# Patient Record
Sex: MALE | Race: BLACK OR AFRICAN AMERICAN | NOT HISPANIC OR LATINO | RURAL
[De-identification: names, ages, dates, MRNs, and addresses within clinical notes are randomized per-mention and may not be internally consistent; named-entity substitution may affect disease eponyms.]

---

## 2022-01-24 ENCOUNTER — OFFICE VISIT (OUTPATIENT)
Dept: FAMILY MEDICINE | Facility: CLINIC | Age: 47
End: 2022-01-24
Payer: COMMERCIAL

## 2022-01-24 VITALS
DIASTOLIC BLOOD PRESSURE: 74 MMHG | OXYGEN SATURATION: 99 % | BODY MASS INDEX: 27.4 KG/M2 | HEIGHT: 69 IN | TEMPERATURE: 100 F | RESPIRATION RATE: 18 BRPM | SYSTOLIC BLOOD PRESSURE: 141 MMHG | WEIGHT: 185 LBS | HEART RATE: 98 BPM

## 2022-01-24 DIAGNOSIS — R51.9 HEAD ACHE: ICD-10-CM

## 2022-01-24 DIAGNOSIS — R50.9 FEVER, UNSPECIFIED FEVER CAUSE: ICD-10-CM

## 2022-01-24 DIAGNOSIS — Z11.52 ENCOUNTER FOR SCREENING LABORATORY TESTING FOR COVID-19 VIRUS: Primary | ICD-10-CM

## 2022-01-24 DIAGNOSIS — J02.9 PHARYNGITIS, UNSPECIFIED ETIOLOGY: ICD-10-CM

## 2022-01-24 DIAGNOSIS — R50.9 FEVER: ICD-10-CM

## 2022-01-24 LAB
CTP QC/QA: YES
SARS-COV-2 AG RESP QL IA.RAPID: NEGATIVE

## 2022-01-24 PROCEDURE — 87635 SARS-COV-2 (COVID-19) QUALITATIVE PCR: ICD-10-PCS | Mod: ,,, | Performed by: CLINICAL MEDICAL LABORATORY

## 2022-01-24 PROCEDURE — 99203 PR OFFICE/OUTPT VISIT, NEW, LEVL III, 30-44 MIN: ICD-10-PCS | Mod: ,,, | Performed by: NURSE PRACTITIONER

## 2022-01-24 PROCEDURE — 1160F RVW MEDS BY RX/DR IN RCRD: CPT | Mod: ,,, | Performed by: NURSE PRACTITIONER

## 2022-01-24 PROCEDURE — 3008F PR BODY MASS INDEX (BMI) DOCUMENTED: ICD-10-PCS | Mod: ,,, | Performed by: NURSE PRACTITIONER

## 2022-01-24 PROCEDURE — 87426 SARS CORONAVIRUS 2 ANTIGEN POCT: ICD-10-PCS | Mod: QW,,, | Performed by: NURSE PRACTITIONER

## 2022-01-24 PROCEDURE — 3078F DIAST BP <80 MM HG: CPT | Mod: ,,, | Performed by: NURSE PRACTITIONER

## 2022-01-24 PROCEDURE — 99203 OFFICE O/P NEW LOW 30 MIN: CPT | Mod: ,,, | Performed by: NURSE PRACTITIONER

## 2022-01-24 PROCEDURE — 4010F ACE/ARB THERAPY RXD/TAKEN: CPT | Mod: ,,, | Performed by: NURSE PRACTITIONER

## 2022-01-24 PROCEDURE — 3078F PR MOST RECENT DIASTOLIC BLOOD PRESSURE < 80 MM HG: ICD-10-PCS | Mod: ,,, | Performed by: NURSE PRACTITIONER

## 2022-01-24 PROCEDURE — 4010F PR ACE/ARB THEARPY RXD/TAKEN: ICD-10-PCS | Mod: ,,, | Performed by: NURSE PRACTITIONER

## 2022-01-24 PROCEDURE — 3077F PR MOST RECENT SYSTOLIC BLOOD PRESSURE >= 140 MM HG: ICD-10-PCS | Mod: ,,, | Performed by: NURSE PRACTITIONER

## 2022-01-24 PROCEDURE — 3008F BODY MASS INDEX DOCD: CPT | Mod: ,,, | Performed by: NURSE PRACTITIONER

## 2022-01-24 PROCEDURE — 1159F MED LIST DOCD IN RCRD: CPT | Mod: ,,, | Performed by: NURSE PRACTITIONER

## 2022-01-24 PROCEDURE — 1159F PR MEDICATION LIST DOCUMENTED IN MEDICAL RECORD: ICD-10-PCS | Mod: ,,, | Performed by: NURSE PRACTITIONER

## 2022-01-24 PROCEDURE — 87426 SARSCOV CORONAVIRUS AG IA: CPT | Mod: QW,,, | Performed by: NURSE PRACTITIONER

## 2022-01-24 PROCEDURE — 1160F PR REVIEW ALL MEDS BY PRESCRIBER/CLIN PHARMACIST DOCUMENTED: ICD-10-PCS | Mod: ,,, | Performed by: NURSE PRACTITIONER

## 2022-01-24 PROCEDURE — 87635 SARS-COV-2 COVID-19 AMP PRB: CPT | Mod: ,,, | Performed by: CLINICAL MEDICAL LABORATORY

## 2022-01-24 PROCEDURE — 3077F SYST BP >= 140 MM HG: CPT | Mod: ,,, | Performed by: NURSE PRACTITIONER

## 2022-01-24 RX ORDER — AMLODIPINE BESYLATE 10 MG/1
10 TABLET ORAL DAILY
COMMUNITY
Start: 2022-01-06

## 2022-01-24 RX ORDER — BENAZEPRIL HYDROCHLORIDE 20 MG/1
20 TABLET ORAL DAILY
COMMUNITY
Start: 2022-01-06

## 2022-01-24 RX ORDER — IBUPROFEN 600 MG/1
TABLET ORAL
COMMUNITY
Start: 2021-10-06

## 2022-01-24 NOTE — PROGRESS NOTES
"Subjective:       Patient ID: Marla Payton is a 46 y.o. male.    Chief Complaint: COVID-19 Concerns (FEVER/FATIGUE/CONGESTION)    HPI  Review of Systems   Constitutional: Positive for chills, fever and malaise/fatigue.   HENT: Positive for congestion. Negative for sore throat.    Respiratory: Positive for cough.    Cardiovascular: Negative.    Gastrointestinal: Negative.    Musculoskeletal: Positive for myalgias.   Neurological: Positive for headaches.          Reviewed family, medical, surgical, and social history.    Objective:      BP (!) 141/74   Pulse 98   Temp 99.5 °F (37.5 °C)   Resp 18   Ht 5' 9" (1.753 m)   Wt 83.9 kg (185 lb)   SpO2 99%   BMI 27.32 kg/m²   Physical Exam  Vitals and nursing note reviewed.   Constitutional:       General: He is not in acute distress.     Appearance: Normal appearance. He is not ill-appearing or diaphoretic.   HENT:      Head: Normocephalic.      Right Ear: Hearing, tympanic membrane, ear canal and external ear normal.      Left Ear: Hearing, tympanic membrane, ear canal and external ear normal.      Nose: Mucosal edema, congestion and rhinorrhea present. Rhinorrhea is clear.      Right Turbinates: Enlarged and swollen.      Left Turbinates: Enlarged and swollen.      Mouth/Throat:      Lips: Pink.      Mouth: Mucous membranes are moist.      Pharynx: Uvula midline. Posterior oropharyngeal erythema present. No pharyngeal swelling, oropharyngeal exudate or uvula swelling.      Tonsils: No tonsillar exudate or tonsillar abscesses.   Cardiovascular:      Rate and Rhythm: Normal rate and regular rhythm.      Pulses: Normal pulses.      Heart sounds: Normal heart sounds.   Pulmonary:      Effort: Pulmonary effort is normal.      Breath sounds: Normal breath sounds.   Musculoskeletal:      Cervical back: Normal range of motion and neck supple.   Skin:     General: Skin is warm and dry.      Capillary Refill: Capillary refill takes less than 2 seconds.   Neurological:      " General: No focal deficit present.      Mental Status: He is alert and oriented to person, place, and time.   Psychiatric:         Mood and Affect: Mood normal.         Behavior: Behavior normal.         Thought Content: Thought content normal.         Judgment: Judgment normal.            Office Visit on 01/24/2022   Component Date Value Ref Range Status    SARS Coronavirus 2 Antigen 01/24/2022 Negative  Negative Final     Acceptable 01/24/2022 Yes   Final      Assessment:       1. Encounter for screening laboratory testing for COVID-19 virus    2. Fever, unspecified fever cause    3. Pharyngitis, unspecified etiology    4. Head ache    5. Fever        Plan:       Encounter for screening laboratory testing for COVID-19 virus  -     SARS Coronavirus 2 Antigen, POCT    Fever, unspecified fever cause  -     POCT Influenza A/B  -     POCT rapid strep A    Pharyngitis, unspecified etiology  -     POCT Influenza A/B  -     POCT rapid strep A    Head ache  -     COVID-19 Routine Screening; Future; Expected date: 01/24/2022    Fever  -     COVID-19 Routine Screening; Future; Expected date: 01/24/2022    Copy of result given (neg rapid covid/flu/strep)  Covid PCR done  Quarantine for now  If PCR neg and s/s persist, RTC for repeat rapid test  OTC meds for s/s  Retest with any new s/s  RTC PRN          Risks, benefits, and side effects were discussed with the patient. All questions were answered to the fullest satisfaction of the patient, and pt verbalized understanding and agreement to treatment plan. Pt was to call with any new or worsening symptoms, or present to the ER.

## 2022-01-24 NOTE — LETTER
January 28, 2022      [facility]  [address] [place] [address]  Phone: [phone]  Fax: [phone]       Patient: Marla Payton   YOB: 1975  Date of Visit: 01/28/2022    To Whom It May Concern:    Jose Payton  was at [facility] on 01/24/2022. The patient may return to work/school on 01/31/2022 with no restrictions. If you have any questions or concerns, or if I can be of further assistance, please do not hesitate to contact me.    Sincerely,    Josh Hardwick LPN

## 2022-01-24 NOTE — LETTER
January 30, 2022      [facility]  [address] [place] [address]  Phone: [phone]  Fax: [phone]       Patient: Marla Payton   YOB: 1975  Date of Visit: 01/24/2022    To Whom It May Concern:    Jose Payton  was at [facility] on 01/24/2022. The patient may return to work/school on 01/31/2022 with no restrictions. If you have any questions or concerns, or if I can be of further assistance, please do not hesitate to contact me.    Sincerely,    Zakia Mullen RN

## 2022-01-25 LAB — SARS-COV-2 RNA RESP QL NAA+PROBE: POSITIVE

## 2022-01-31 ENCOUNTER — TELEPHONE (OUTPATIENT)
Dept: FAMILY MEDICINE | Facility: CLINIC | Age: 47
End: 2022-01-31
Payer: COMMERCIAL

## 2022-01-31 NOTE — TELEPHONE ENCOUNTER
Attempted to call patient about lab findings. Phone number is no longer in service.----- Message from Zakia Mullen RN sent at 1/29/2022  7:28 PM CST -----    ----- Message -----  From: TEA Meza  Sent: 1/25/2022   4:08 PM CST  To: Zakia Mullen RN    Notify of positive PCR. Quarantine for 7 days. Thanks

## 2023-08-11 ENCOUNTER — HOSPITAL ENCOUNTER (EMERGENCY)
Facility: HOSPITAL | Age: 48
Discharge: HOME | End: 2023-08-11
Attending: EMERGENCY MEDICINE

## 2023-08-11 VITALS
OXYGEN SATURATION: 98 % | HEIGHT: 69 IN | RESPIRATION RATE: 16 BRPM | HEART RATE: 83 BPM | BODY MASS INDEX: 28.88 KG/M2 | WEIGHT: 195 LBS | DIASTOLIC BLOOD PRESSURE: 88 MMHG | TEMPERATURE: 99 F | SYSTOLIC BLOOD PRESSURE: 140 MMHG

## 2023-08-11 DIAGNOSIS — B34.9 VIRAL SYNDROME: Primary | ICD-10-CM

## 2023-08-11 LAB
FLUAV AG UPPER RESP QL IA.RAPID: NEGATIVE
FLUBV AG UPPER RESP QL IA.RAPID: NEGATIVE
SARS-COV+SARS-COV-2 AG RESP QL IA.RAPID: NEGATIVE

## 2023-08-11 PROCEDURE — 25000003 PHARM REV CODE 250: Performed by: EMERGENCY MEDICINE

## 2023-08-11 PROCEDURE — 99284 EMERGENCY DEPT VISIT MOD MDM: CPT | Mod: ,,, | Performed by: EMERGENCY MEDICINE

## 2023-08-11 PROCEDURE — 99283 EMERGENCY DEPT VISIT LOW MDM: CPT

## 2023-08-11 PROCEDURE — 87428 SARSCOV & INF VIR A&B AG IA: CPT | Performed by: EMERGENCY MEDICINE

## 2023-08-11 PROCEDURE — 99284 PR EMERGENCY DEPT VISIT,LEVEL IV: ICD-10-PCS | Mod: ,,, | Performed by: EMERGENCY MEDICINE

## 2023-08-11 RX ORDER — ONDANSETRON 4 MG/1
4 TABLET, ORALLY DISINTEGRATING ORAL
Status: COMPLETED | OUTPATIENT
Start: 2023-08-11 | End: 2023-08-11

## 2023-08-11 RX ORDER — ACETAMINOPHEN 500 MG
1000 TABLET ORAL
Status: COMPLETED | OUTPATIENT
Start: 2023-08-11 | End: 2023-08-11

## 2023-08-11 RX ORDER — ONDANSETRON 4 MG/1
4 TABLET, ORALLY DISINTEGRATING ORAL EVERY 6 HOURS PRN
Qty: 10 TABLET | Refills: 0 | Status: SHIPPED | OUTPATIENT
Start: 2023-08-11

## 2023-08-11 RX ADMIN — ONDANSETRON 4 MG: 4 TABLET, ORALLY DISINTEGRATING ORAL at 09:08

## 2023-08-11 RX ADMIN — ACETAMINOPHEN 1000 MG: 500 TABLET ORAL at 07:08

## 2023-08-11 NOTE — ED PROVIDER NOTES
Encounter Date: 8/11/2023       History     Chief Complaint   Patient presents with    Cough    Nasal Congestion    Generalized Body Aches     Patient complains of body aches chills subjective fever nausea and diarrhea that started in the past 12 hours.  Patient has had COVID twice before and has had his boosters.  He is never hospitalized due to COVID.  He has a past medical history of hypertension no diabetes no other comorbidities.  Mild associated abdominal cramping.  No known COVID exposure.  He is off work next week on vacation but is missing work today due to his illness.      Review of patient's allergies indicates:   Allergen Reactions    Codeine Itching     Past Medical History:   Diagnosis Date    Hypertension      Past Surgical History:   Procedure Laterality Date    HERNIA REPAIR       History reviewed. No pertinent family history.  Social History     Tobacco Use    Smoking status: Some Days     Current packs/day: 0.00     Types: Cigars    Smokeless tobacco: Never   Substance Use Topics    Alcohol use: Yes    Drug use: Never     Review of Systems   Constitutional:  Positive for chills and fever.   HENT:  Negative for sore throat.    Respiratory:  Negative for shortness of breath.    Cardiovascular:  Negative for chest pain.   Gastrointestinal:  Positive for diarrhea and nausea.   Genitourinary:  Negative for dysuria.   Musculoskeletal:  Positive for myalgias. Negative for back pain.   Skin:  Negative for rash.   Neurological:  Negative for weakness.   Hematological:  Does not bruise/bleed easily.       Physical Exam     Initial Vitals [08/11/23 0729]   BP Pulse Resp Temp SpO2   (!) 140/88 83 16 (!) 100.4 °F (38 °C) 98 %      MAP       --         Physical Exam    Medical Screening Exam   See Full Note    ED Course   Procedures  Labs Reviewed   SARS-COV2 (COVID) W/ FLU ANTIGEN - Normal    Narrative:     Negative SARS-CoV results should not be used as the sole basis for treatment or patient management  decisions; negative results should be considered in the context of a patient's recent exposures, history and the presene of clinical signs and symptoms consistent with COVID-19.  Negative results should be treated as presumptive and confirmed by molecular assay, if necessary for patient management.          Imaging Results    None          Medications   acetaminophen tablet 1,000 mg (1,000 mg Oral Given 8/11/23 9867)   ondansetron disintegrating tablet 4 mg (4 mg Oral Given 8/11/23 1377)     Medical Decision Making:   ED Management:  [facility]    Patient presents for emergent evaluation of acute leuks fever nausea diarrhea that poses a threat to life and/or bodily function.    In the ED patient found to have acute viral syndrome.    I ordered labs and personally reviewed them.  Labs significant for COVID and flu negative.  Nontoxic appearing do not suspect dehydration do not suspect pneumonia.  Lung sounds are clear.  No hypoxia.    Discharge [facility]  Patient was managed in the ED with Zofran Tylenol   The response to treatment was improved.    Patient was discharged in stable condition.  Detailed return precautions discussed.                          Clinical Impression:   Final diagnoses:  [B34.9] Viral syndrome (Primary)        ED Disposition Condition    Discharge Stable          ED Prescriptions       Medication Sig Dispense Start Date End Date Auth. Provider    ondansetron (ZOFRAN-ODT) 4 MG TbDL Take 1 tablet (4 mg total) by mouth every 6 (six) hours as needed (nausea). 10 tablet 8/11/2023 -- Justice Nevarez MD          Follow-up Information       Follow up With Specialties Details Why Contact Info    [facility] - Emergency Department Emergency Medicine Go to  As needed, If symptoms worsen [address] [place] [address] [phone]             Justice Nevarez MD  08/12/23 1182

## 2023-08-11 NOTE — Clinical Note
"Marla Blasstacy Payton was seen and treated in our emergency department on 8/11/2023.  He may return to work on 08/14/2023.       If you have any questions or concerns, please don't hesitate to call.      Justice Nevarez MD"

## 2023-08-11 NOTE — DISCHARGE INSTRUCTIONS
Your COVID and flu swab came back negative.    Use prescription nausea medication as E scribed to help with nausea    Use ibuprofen and Tylenol as needed    Return the ER if symptoms are worsening or new symptoms develop

## 2025-02-10 ENCOUNTER — HOSPITAL ENCOUNTER (EMERGENCY)
Facility: HOSPITAL | Age: 50
Discharge: HOME | End: 2025-02-10
Payer: COMMERCIAL

## 2025-02-10 VITALS
BODY MASS INDEX: 29.62 KG/M2 | RESPIRATION RATE: 17 BRPM | HEIGHT: 69 IN | OXYGEN SATURATION: 98 % | WEIGHT: 200 LBS | SYSTOLIC BLOOD PRESSURE: 134 MMHG | HEART RATE: 71 BPM | TEMPERATURE: 99 F | DIASTOLIC BLOOD PRESSURE: 89 MMHG

## 2025-02-10 DIAGNOSIS — M54.50 ACUTE BILATERAL LOW BACK PAIN WITHOUT SCIATICA: Primary | ICD-10-CM

## 2025-02-10 PROCEDURE — 99284 EMERGENCY DEPT VISIT MOD MDM: CPT | Mod: 25

## 2025-02-10 PROCEDURE — 96372 THER/PROPH/DIAG INJ SC/IM: CPT

## 2025-02-10 PROCEDURE — 63600175 PHARM REV CODE 636 W HCPCS: Mod: JZ,TB

## 2025-02-10 RX ORDER — METHYLPREDNISOLONE SOD SUCC 125 MG
125 VIAL (EA) INJECTION
Status: COMPLETED | OUTPATIENT
Start: 2025-02-10 | End: 2025-02-10

## 2025-02-10 RX ORDER — IBUPROFEN 800 MG/1
800 TABLET ORAL 3 TIMES DAILY
Qty: 30 TABLET | Refills: 0 | Status: SHIPPED | OUTPATIENT
Start: 2025-02-10 | End: 2025-02-20

## 2025-02-10 RX ORDER — KETOROLAC TROMETHAMINE 30 MG/ML
30 INJECTION, SOLUTION INTRAMUSCULAR; INTRAVENOUS
Status: COMPLETED | OUTPATIENT
Start: 2025-02-10 | End: 2025-02-10

## 2025-02-10 RX ORDER — METHOCARBAMOL 500 MG/1
1000 TABLET, FILM COATED ORAL 3 TIMES DAILY
Qty: 60 TABLET | Refills: 0 | Status: SHIPPED | OUTPATIENT
Start: 2025-02-10 | End: 2025-02-20

## 2025-02-10 RX ADMIN — METHYLPREDNISOLONE SODIUM SUCCINATE 125 MG: 125 INJECTION, POWDER, FOR SOLUTION INTRAMUSCULAR; INTRAVENOUS at 06:02

## 2025-02-10 RX ADMIN — KETOROLAC TROMETHAMINE 30 MG: 30 INJECTION, SOLUTION INTRAMUSCULAR at 06:02

## 2025-02-10 NOTE — Clinical Note
"Marla Epperson" Fito was seen and treated in our emergency department on 2/10/2025.  He may return to work on 02/12/2025.       If you have any questions or concerns, please don't hesitate to call.      Bandar Ornelas, NP"

## 2025-02-10 NOTE — ED PROVIDER NOTES
Encounter Date: 2/10/2025       History     Chief Complaint   Patient presents with    Low-back Pain     Pt reports lower back pain for a few days     49-year-old male presents to the emergency department for evaluation of back pain that began 2 days ago.  Reports that pain came acutely worse today while he was pushing a patient in a wheelchair.  Denies fall, recent injury to lower back.  Denies numbness/tingling/inability to bear weight to bilateral lower extremities.  Denies loss of bowel/bladder control.    The history is provided by the patient. No  was used.   Back Pain   This is a new problem. The current episode started two days ago. The problem occurs intermittently. The problem has been unchanged. The pain is associated with no known injury. The pain is present in the lumbar spine. The quality of the pain is described as aching. The pain does not radiate. The symptoms are aggravated by twisting and bending. Pertinent negatives include no chest pain, no fever, no headaches, no abdominal pain, no bowel incontinence, no perianal numbness, no dysuria, no pelvic pain, no leg pain, no paresthesias, no tingling and no weakness. He has tried nothing for the symptoms.     Review of patient's allergies indicates:   Allergen Reactions    Codeine Itching     Past Medical History:   Diagnosis Date    Hypertension      Past Surgical History:   Procedure Laterality Date    HERNIA REPAIR       No family history on file.  Social History     Tobacco Use    Smoking status: Some Days     Types: Cigars    Smokeless tobacco: Never   Substance Use Topics    Alcohol use: Yes    Drug use: Never     Review of Systems   Constitutional:  Negative for activity change, appetite change, chills and fever.   Cardiovascular:  Negative for chest pain.   Gastrointestinal:  Negative for abdominal pain and bowel incontinence.   Genitourinary:  Negative for dysuria and pelvic pain.   Musculoskeletal:  Positive for back pain.  Negative for gait problem, neck pain and neck stiffness.   Neurological:  Negative for tingling, weakness, headaches and paresthesias.   Psychiatric/Behavioral:  Negative for confusion.    All other systems reviewed and are negative.      Physical Exam     Initial Vitals [02/10/25 1656]   BP Pulse Resp Temp SpO2   134/89 71 17 99 °F (37.2 °C) 98 %      MAP       --         Physical Exam    Vitals reviewed.  Constitutional: He appears well-nourished. No distress.   HENT:   Head: Normocephalic.   Eyes: Conjunctivae are normal. Right eye exhibits no discharge. Left eye exhibits no discharge.   Neck: Neck supple.   Normal range of motion.  Cardiovascular:  Normal rate, regular rhythm and normal heart sounds.           Pulmonary/Chest: Breath sounds normal. No respiratory distress. He has no wheezes. He has no rhonchi. He exhibits no tenderness.   Abdominal: Abdomen is soft. Bowel sounds are normal. He exhibits no distension. There is no abdominal tenderness. There is no guarding.   Musculoskeletal:         General: No tenderness. Normal range of motion.      Cervical back: Normal, normal range of motion and neck supple.      Thoracic back: Normal.      Lumbar back: Normal. No swelling, edema, signs of trauma, spasms or tenderness. Normal range of motion.     Lymphadenopathy:     He has no cervical adenopathy.   Neurological: He is alert and oriented to person, place, and time. He has normal strength. No sensory deficit. GCS score is 15. GCS eye subscore is 4. GCS verbal subscore is 5. GCS motor subscore is 6.   Skin: Skin is warm and dry. Capillary refill takes less than 2 seconds.   Psychiatric: He has a normal mood and affect. His behavior is normal.         Medical Screening Exam   See Full Note    ED Course   Procedures  Labs Reviewed - No data to display       Imaging Results              X-Ray Lumbar Spine Ap And Lateral (Final result)  Result time 02/10/25 18:38:19      Final result by Dayron Rucker MD  (02/10/25 18:38:19)                   Impression:      No acute radiographic abnormality.      Electronically signed by: Dayron Matos  Date:    02/10/2025  Time:    18:38               Narrative:    EXAMINATION:  XR LUMBAR SPINE AP AND LATERAL    CLINICAL HISTORY:  lower back pain;    TECHNIQUE:  AP, lateral and spot images were performed of the lumbar spine.    COMPARISON:  None    FINDINGS:  No acute fracture or traumatic subluxation.  Disc spaces appear adequately maintained.  Posterior elements are intact.                                       Medications   ketorolac injection 30 mg (30 mg Intramuscular Given 2/10/25 1808)   methylPREDNISolone sodium succinate injection 125 mg (125 mg Intramuscular Given 2/10/25 1808)     Medical Decision Making  49-year-old male presents to the emergency department for evaluation of back pain that began 2 days ago.  Reports that pain came acutely worse today while he was pushing a patient in a wheelchair.  Denies fall, recent injury to lower back.  Denies numbness/tingling/inability to bear weight to bilateral lower extremities.  Denies loss of bowel/bladder control.  Ordered and reviewed lumbar xray as well as radiologist's interpretation with results significant for no acute radiographic abnormality.  Toradol, solu-medrol IM administered in ED  Follow-up and return precautions discussed with patient, who verbalized understanding  Prescriptions provided  Diagnosis: Low back pain    Amount and/or Complexity of Data Reviewed  Radiology: ordered.    Risk  Prescription drug management.                                      Clinical Impression:   Final diagnoses:  [M54.50] Acute bilateral low back pain without sciatica (Primary)        ED Disposition Condition    Discharge Stable          ED Prescriptions       Medication Sig Dispense Start Date End Date Auth. Provider    ibuprofen (ADVIL,MOTRIN) 800 MG tablet Take 1 tablet (800 mg total) by mouth 3 (three) times daily. for 10  days 30 tablet 2/10/2025 2/20/2025 Bandar Ornelas, JALYN    methocarbamoL (ROBAXIN) 500 MG Tab Take 2 tablets (1,000 mg total) by mouth 3 (three) times daily. for 10 days 60 tablet 2/10/2025 2/20/2025 Bandar Ornelas NP          Follow-up Information    None          Bandar Ornelas NP  02/10/25 4414

## 2025-02-11 NOTE — DISCHARGE INSTRUCTIONS
Take medication as ordered. Use warm compresses as needed for comfort. Follow up with primary care provider in three days for re-evaluation. Return to the emergency department for new or worsening symptoms.